# Patient Record
(demographics unavailable — no encounter records)

---

## 2025-03-14 NOTE — PHYSICAL EXAM
[Well Developed] : well developed [Well Nourished] : well nourished [No Acute Distress] : no acute distress [Normal Venous Pressure] : normal venous pressure [No Carotid Bruit] : no carotid bruit [Normal S1, S2] : normal S1, S2 [No Rub] : no rub [Normal Rate] : normal [Click] : no click [Pericardial Rub] : no pericardial rub [Rt] : no varicose veins of the right leg [Lt] : no varicose veins of the left leg [Clear Lung Fields] : clear lung fields [Good Air Entry] : good air entry [No Respiratory Distress] : no respiratory distress  [Soft] : abdomen soft [Non Tender] : non-tender [No Masses/organomegaly] : no masses/organomegaly [Normal Bowel Sounds] : normal bowel sounds [Normal Gait] : normal gait [No Edema] : no edema [No Cyanosis] : no cyanosis [No Clubbing] : no clubbing [No Varicosities] : no varicosities [No Rash] : no rash [No Skin Lesions] : no skin lesions [Moves all extremities] : moves all extremities [No Focal Deficits] : no focal deficits [Normal Speech] : normal speech [Alert and Oriented] : alert and oriented [Normal memory] : normal memory [General Appearance - Well Developed] : well developed [Normal Appearance] : normal appearance [Well Groomed] : well groomed [General Appearance - Well Nourished] : well nourished [No Deformities] : no deformities [General Appearance - In No Acute Distress] : no acute distress [Normal Conjunctiva] : the conjunctiva exhibited no abnormalities [Normal Oral Mucosa] : normal oral mucosa [Normal Oropharynx] : normal oropharynx [JVD Elevated _____cm] : JVD elevated [unfilled] ~U cm above clavicle [Normal Jugular Venous A Waves Present] : normal jugular venous A waves present [Normal Jugular Venous V Waves Present] : normal jugular venous V waves present [No Jugular Venous Moore A Waves] : no jugular venous moore A waves [Respiration, Rhythm And Depth] : normal respiratory rhythm and effort [Exaggerated Use Of Accessory Muscles For Inspiration] : no accessory muscle use [Auscultation Breath Sounds / Voice Sounds] : lungs were clear to auscultation bilaterally [Bowel Sounds] : normal bowel sounds [Abdomen Soft] : soft [Abnormal Walk] : normal gait [Gait - Sufficient For Exercise Testing] : the gait was sufficient for exercise testing [Nail Clubbing] : no clubbing of the fingernails [Cyanosis, Localized] : no localized cyanosis [Skin Color & Pigmentation] : normal skin color and pigmentation [Skin Turgor] : normal skin turgor [] : no rash [Oriented To Time, Place, And Person] : oriented to person, place, and time [Affect] : the affect was normal [Mood] : the mood was normal [No Anxiety] : not feeling anxious [5th Left ICS - MCL] : palpated at the 5th LICS in the midclavicular line [Normal] : normal [No Precordial Heave] : no precordial heave was noted [Apical Thrill] : no thrill palpable at the apex [Bradycardia] : bradycardic [Heart Rate ___] : [unfilled] bpm [Rhythm Regular] : regular [Normal S1] : normal S1 [Normal S2] : normal S2 [No Gallop] : no gallop heard [S3] : no S3 [S4] : no S4 [No Murmur] : no murmurs heard [II] : a grade 2 [Right Carotid Bruit] : no bruit heard over the right carotid [Left Carotid Bruit] : no bruit heard over the left carotid [Right Femoral Bruit] : no bruit heard over the right femoral artery [Left Femoral Bruit] : no bruit heard over the left femoral artery [2+] : left 2+ [No Abnormalities] : the abdominal aorta was not enlarged and no bruit was heard [No Pitting Edema] : no pitting edema present

## 2025-03-14 NOTE — CARDIOLOGY SUMMARY
[de-identified] : 6/25/2021 [de-identified] : Holter: 10/28/2020 [de-identified] : 8/17/2020 [de-identified] : 2/17/2020 [de-identified] : Carotid: 8/27/2020 [___] : [unfilled] [LVEF ___%] : LVEF [unfilled]%

## 2025-03-14 NOTE — DISCUSSION/SUMMARY
[FreeTextEntry1] : This is a 71-year-old female with past medical history significant for occasional palpitations, murmur, who comes in for follow-up cardiac evaluation.  She denies chest pain, shortness of breath, dizziness, palpitations or syncope. The patient was hospitalized at [facility] in March 2025 after complaining of rapid heartbeat.  No significant arrhythmias were noted. Nuclear stress test done March 11, 2025 demonstrated small size mild defect in the distal anterior and apical wall that was very reversible consistent with myocardial ischemia.  Medium size moderate defect in the inferior inferior septal wall that was fixed consistent with infarct with estimated ejection fraction 63%.  The patient was able to exercise for 12 minutes achieving 13.4 METS; EKG demonstrated 1.5 mm upsloping ST depressions in leads V3 through V6. The patient underwent cardiac catheterization March 12, 2025 which demonstrated minor irregularities in the proximal left anterior descending artery with no disease in the right coronary artery, left circumflex artery, and left main artery.  The patient was sent home with an MCOT monitor to rule out significant arrhythmia or heart block.  She will make an appoint with Dr. Colby of electrophysiology.. Further recommendations will be made after the results of her MCOT monitor is available for review. The patient had normal exercise stress test April 24, 2024. The patient will continue on her current diet and exercise program. Blood work done November 22, 2023 demonstrated cholesterol 190, HDL of 103, triglycerides of 36, LDL calculated 77, LDL direct 77 mg/dL and non-HDL cholesterol 87 mg/dL.. She will remain on her current antihypertensive therapy Electrocardiogram done October 24, 2023 demonstrates sinus bradycardia rate 54 bpm is otherwise remarkable for lead reversal, chest leads are unremarkable. The patient has been stable on telmisartan 80 mg daily, doxazosin 1 mg (by her nephrologist) taken at bedtime, and amlodipine 10 mg. She understands she must maintain good hydration. Electrocardiogram done May 23, 2023 demonstrate sinus bradycardia rate of 53 bpm is otherwise remarkable for juvenile T wave pattern. Lipid panel done May 9, 2023 demonstrated cholesterol 194, HDL of 100, triglycerides 36, LDL calculated 84 mg/dL and non-HDL cholesterol 94 mg/dL.. The patient had a normal exercise stress test July 18, 2022 with an appropriate/normal blood pressure response to exercise.    She will continue work with a registered dietitian. Blood work done October 24, 2022 demonstrated total cholesterol 179, HDL of 89, triglycerides of 58, LDL cholesterol 77 mg/dL non-HDL cholesterol 90 mg/dL with hemoglobin A1c of 5.6 The patient had 24-hour blood pressure monitor done November 17, 2021 which demonstrated an average blood pressure 133/73 during the daytime and 131/72 during the nighttime. Electrocardiogram done November 8, 2021 demonstrates normal sinus rhythm rate 60 bpm is otherwise unremarkable. The patient had a normal exercise stress test August 17, 2020. Echo Doppler examination done August 17, 2020 demonstrated borderline criteria for posterior leaflet mitral valve prolapse with mild mitral valve regurgitation, mild tricuspid valve regurgitation, minimal pulmonic valve regurgitation with estimated ejection fraction of 64%  The patient understands that aerobic exercises must be increased to 40 minutes 4 times per week. A detailed discussion of lifestyle modification was done today. The patient has a good understanding of the diagnosis, and treatment plan. Lifestyle modification was also outlined.

## 2025-03-14 NOTE — REASON FOR VISIT
[CV Risk Factors and Non-Cardiac Disease] : CV risk factors and non-cardiac disease [Arrhythmia/ECG Abnorrmalities] : arrhythmia/ECG abnormalities [Hypertension] : hypertension [FreeTextEntry3] : Dr Estes [Follow-Up - Clinic] : a clinic follow-up of [Palpitations] : palpitations [FreeTextEntry1] : murmur

## 2025-07-10 NOTE — ADDENDUM
[FreeTextEntry1] : I, Chelle Abad wrote this note acting as a scribe for Dr. Franck Zazueta on 07/10/2025.   All medical record entries made by the Scribe were at my, Dr. Franck Zazueta MD., direction and personally dictated by me on 07/10/2025. I have personally reviewed the chart and agree that the record accurately reflects my personal performance of the history, physical exam, assessment and plan.

## 2025-07-10 NOTE — PHYSICAL EXAM
[de-identified] : Patient is WDWN, alert, and in no acute distress. Breathing is unlabored. She is grossly oriented to person, place, and time.  Right Hand: There is mild basal joint tenderness. Full arc of motion in the fingers with pain on thumb ROM. All intrinsic and  extrinsic hand muscles 5/5. No joint instability on provocative testing. Sensation is intact to light touch. There are no skin lesions or discoloration.  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

## 2025-07-10 NOTE — DISCUSSION/SUMMARY
[FreeTextEntry1] : The underlying pathophysiology was reviewed with the patient. XR films were reviewed with the patient. Discussed at length the nature of the patients condition. The right hand symptoms are secondary to CMC arthritis. I reassured her that her condition is not of serious concern and is very common.   Treatment options were discussed including; observation, NSAIDS, analgesics, injection, physical therapy, and surgical intervention.  Wear a thumb spica brace as needed. I advised an injection after she tries conservative treatment.   Patient was advised to try OTC medication and topical analgesics for pain management. I recommend that the patient utilize Tylenol, Advil, Aleve, Voltaren gel, Icy Hot, Biofreeze, Bengay, or 4% lidocaine patches.  Gentle range of motion, stretching, and strengthening exercises were encouraged. Increase activity as tolerated.  All questions answered, understanding verbalized. Patient in agreement with plan of care. Patient is advised to follow-up as needed.  If the patient begins to experience any changes or severe exacerbation of her symptoms, she should reach out to me as soon as possible.

## 2025-07-10 NOTE — HISTORY OF PRESENT ILLNESS
[FreeTextEntry1] : Pt is a 70 y/o female RHD c/o right hand pain x few months. Denies injury. Pain and weakness is elicited only with movement and grabbing objects. The pain is more so by the radial aspect/thumb side. She is s/p bilateral CTR and trigger finger release 14 years ago by Dr MAURO

## 2025-07-23 NOTE — ASSESSMENT
[FreeTextEntry1] : This is a 71-year-old female here today for follow up cardiac evaluation.  She has a past medical history significant for mild coronary artery disease, occasional palpitations, sinus bradycardia, and occasional dyspnea on exertion.   HPI: She is feeling generally well today and denies chest pain, dizziness, heart palpitations, recent episodes of syncope or falls, SOB, or dyspnea at this time.  Current Medications: Aspirin 81 mg daily, Amlodipine 10 mg daily, Telmisartan 80 mg daily, and Doxazosin 1 mg daily.   BLOOD PRESSURE: Stable. Reports labile BPs at home when she checks with values sometimes in the 140s systolic. She has history of electrolyte abnormalities on HCTZ in the past and was told never to take the medication. She will keep a log of her home BPs and let us know if BP continues to show systolic reading > 140.    BLOOD WORK:  *LDL target goal < 70* -New blood work to be done this Friday prior to PCP visit with Dr. Quintanilla evaluate lipid profile, CBC, BMP, hepatic function, A1C and TSH. -Lipid panel done October 2024 demonstrated cholesterol 207, , triglycerides 40, LDL 90, non-, LDL direct 90.     TESTING/REPORTS: -EKG done 07/23/2025 demonstrated sinus bradycardia rate 48 bpm otherwise remarkable for nonspecific ST-T wave changes.   -MOCT monitor done March-April 2025 demonstrating SVT occurrence 1 time with fastest run being 1434 bpm, 1% PVC burden.   -Cardiac catheterization done March 12, 2025 which demonstrated minor irregularities in the proximal left anterior descending artery with no disease in the right coronary artery, left circumflex artery, and left main artery.  The patient was sent home with an MCOT monitor to rule out significant arrhythmia or heart block  -Nuclear stress test done March 11, 2025 demonstrated small size mild defect in the distal anterior and apical wall that was very reversible consistent with myocardial ischemia.  Medium size moderate defect in the inferior inferior septal wall that was fixed consistent with infarct with estimated ejection fraction 63%.  The patient was able to exercise for 12 minutes achieving 13.4 METS; EKG demonstrated 1.5 mm upsloping ST depressions in leads V3 through V6.  -The patient had normal exercise stress test April 24, 2024.  -Electrocardiogram done October 24, 2023 demonstrates sinus bradycardia rate 54 bpm is otherwise remarkable for lead reversal, chest leads are unremarkable.   PLAN: -She will keep a log of her home BPs and let us know if BP continues to show systolic reading > 140.  -She will continue her current medications and contact the office if problems arise before next follow-up appointment. -Follow-up with PCP routinely.   I have discussed the plan of care with MARYSOL KHALIL and she will follow up in 3 months. They are compliant with all of their medications.     The patient understands that aerobic exercises must be increased to 40 minutes 4 times/week and a detailed discussion of lifestyle modification was done today.   The patient has a good understanding of the diagnosis, treatment plan and lifestyle modification.   They will contact me at the office for any questions with their care or any changes in their health status.   The patient was discussed with supervision physician Dr. Goran Zavala at the time of the visit and the plan of care will be carried out as noted above.     WILDER Anderson NP

## 2025-07-23 NOTE — DISCUSSION/SUMMARY
[FreeTextEntry1] : Dr. Zavala-(PRIOR VISIT and PMH WITH Dr. Zavala): This is a 71-year-old female with past medical history significant for occasional palpitations, murmur, who comes in for follow-up cardiac evaluation.  She denies chest pain, shortness of breath, dizziness, palpitations or syncope. The patient was hospitalized at [facility] in March 2025 after complaining of rapid heartbeat.  No significant arrhythmias were noted. Nuclear stress test done March 11, 2025 demonstrated small size mild defect in the distal anterior and apical wall that was very reversible consistent with myocardial ischemia.  Medium size moderate defect in the inferior inferior septal wall that was fixed consistent with infarct with estimated ejection fraction 63%.  The patient was able to exercise for 12 minutes achieving 13.4 METS; EKG demonstrated 1.5 mm upsloping ST depressions in leads V3 through V6. The patient underwent cardiac catheterization March 12, 2025 which demonstrated minor irregularities in the proximal left anterior descending artery with no disease in the right coronary artery, left circumflex artery, and left main artery.  The patient was sent home with an MCOT monitor to rule out significant arrhythmia or heart block.  She will make an appoint with Dr. Colby of electrophysiology.. Further recommendations will be made after the results of her MCOT monitor is available for review. The patient had normal exercise stress test April 24, 2024. The patient will continue on her current diet and exercise program. Blood work done November 22, 2023 demonstrated cholesterol 190, HDL of 103, triglycerides of 36, LDL calculated 77, LDL direct 77 mg/dL and non-HDL cholesterol 87 mg/dL.. She will remain on her current antihypertensive therapy Electrocardiogram done October 24, 2023 demonstrates sinus bradycardia rate 54 bpm is otherwise remarkable for lead reversal, chest leads are unremarkable. The patient has been stable on telmisartan 80 mg daily, doxazosin 1 mg (by her nephrologist) taken at bedtime, and amlodipine 10 mg. She understands she must maintain good hydration. Electrocardiogram done May 23, 2023 demonstrate sinus bradycardia rate of 53 bpm is otherwise remarkable for juvenile T wave pattern. Lipid panel done May 9, 2023 demonstrated cholesterol 194, HDL of 100, triglycerides 36, LDL calculated 84 mg/dL and non-HDL cholesterol 94 mg/dL.. The patient had a normal exercise stress test July 18, 2022 with an appropriate/normal blood pressure response to exercise.    She will continue work with a registered dietitian. Blood work done October 24, 2022 demonstrated total cholesterol 179, HDL of 89, triglycerides of 58, LDL cholesterol 77 mg/dL non-HDL cholesterol 90 mg/dL with hemoglobin A1c of 5.6 The patient had 24-hour blood pressure monitor done November 17, 2021 which demonstrated an average blood pressure 133/73 during the daytime and 131/72 during the nighttime. Electrocardiogram done November 8, 2021 demonstrates normal sinus rhythm rate 60 bpm is otherwise unremarkable. The patient had a normal exercise stress test August 17, 2020. Echo Doppler examination done August 17, 2020 demonstrated borderline criteria for posterior leaflet mitral valve prolapse with mild mitral valve regurgitation, mild tricuspid valve regurgitation, minimal pulmonic valve regurgitation with estimated ejection fraction of 64%  The patient understands that aerobic exercises must be increased to 40 minutes 4 times per week. A detailed discussion of lifestyle modification was done today. The patient has a good understanding of the diagnosis, and treatment plan. Lifestyle modification was also outlined.

## 2025-07-23 NOTE — CARDIOLOGY SUMMARY
[___] : [unfilled] [LVEF ___%] : LVEF [unfilled]% [de-identified] : 6/25/2021 [de-identified] : Holter: 10/28/2020 [de-identified] : 8/17/2020 [de-identified] : 2/17/2020 [de-identified] : Carotid: 8/27/2020

## 2025-07-23 NOTE — PHYSICAL EXAM
[Well Developed] : well developed [Well Nourished] : well nourished [No Acute Distress] : no acute distress [Normal Venous Pressure] : normal venous pressure [No Carotid Bruit] : no carotid bruit [Normal S1, S2] : normal S1, S2 [No Rub] : no rub [Normal Rate] : normal [Clear Lung Fields] : clear lung fields [Good Air Entry] : good air entry [No Respiratory Distress] : no respiratory distress  [Soft] : abdomen soft [Non Tender] : non-tender [No Masses/organomegaly] : no masses/organomegaly [Normal Bowel Sounds] : normal bowel sounds [Normal Gait] : normal gait [No Edema] : no edema [No Cyanosis] : no cyanosis [No Clubbing] : no clubbing [No Varicosities] : no varicosities [No Rash] : no rash [No Skin Lesions] : no skin lesions [Moves all extremities] : moves all extremities [No Focal Deficits] : no focal deficits [Normal Speech] : normal speech [Alert and Oriented] : alert and oriented [Normal memory] : normal memory [General Appearance - Well Developed] : well developed [Normal Appearance] : normal appearance [Well Groomed] : well groomed [General Appearance - Well Nourished] : well nourished [No Deformities] : no deformities [General Appearance - In No Acute Distress] : no acute distress [Normal Conjunctiva] : the conjunctiva exhibited no abnormalities [Normal Oral Mucosa] : normal oral mucosa [Normal Oropharynx] : normal oropharynx [JVD Elevated _____cm] : JVD elevated [unfilled] ~U cm above clavicle [Normal Jugular Venous A Waves Present] : normal jugular venous A waves present [Normal Jugular Venous V Waves Present] : normal jugular venous V waves present [No Jugular Venous Moore A Waves] : no jugular venous moore A waves [Respiration, Rhythm And Depth] : normal respiratory rhythm and effort [Exaggerated Use Of Accessory Muscles For Inspiration] : no accessory muscle use [Auscultation Breath Sounds / Voice Sounds] : lungs were clear to auscultation bilaterally [Bowel Sounds] : normal bowel sounds [Abdomen Soft] : soft [Abnormal Walk] : normal gait [Gait - Sufficient For Exercise Testing] : the gait was sufficient for exercise testing [Nail Clubbing] : no clubbing of the fingernails [Cyanosis, Localized] : no localized cyanosis [Skin Color & Pigmentation] : normal skin color and pigmentation [Skin Turgor] : normal skin turgor [] : no rash [Oriented To Time, Place, And Person] : oriented to person, place, and time [Affect] : the affect was normal [Mood] : the mood was normal [No Anxiety] : not feeling anxious [5th Left ICS - MCL] : palpated at the 5th LICS in the midclavicular line [Normal] : normal [No Precordial Heave] : no precordial heave was noted [Bradycardia] : bradycardic [Heart Rate ___] : [unfilled] bpm [Rhythm Regular] : regular [Normal S1] : normal S1 [Normal S2] : normal S2 [No Gallop] : no gallop heard [No Murmur] : no murmurs heard [II] : a grade 2 [2+] : left 2+ [No Abnormalities] : the abdominal aorta was not enlarged and no bruit was heard [No Pitting Edema] : no pitting edema present [Click] : no click [Pericardial Rub] : no pericardial rub [Rt] : no varicose veins of the right leg [Lt] : no varicose veins of the left leg [Apical Thrill] : no thrill palpable at the apex [S3] : no S3 [S4] : no S4 [Right Carotid Bruit] : no bruit heard over the right carotid [Left Carotid Bruit] : no bruit heard over the left carotid [Right Femoral Bruit] : no bruit heard over the right femoral artery [Left Femoral Bruit] : no bruit heard over the left femoral artery

## 2025-07-23 NOTE — REASON FOR VISIT
[CV Risk Factors and Non-Cardiac Disease] : CV risk factors and non-cardiac disease [Arrhythmia/ECG Abnorrmalities] : arrhythmia/ECG abnormalities [Hypertension] : hypertension [Follow-Up - Clinic] : a clinic follow-up of [Palpitations] : palpitations [FreeTextEntry3] : Dr Estes [FreeTextEntry1] : murmur

## 2025-07-30 NOTE — HISTORY OF PRESENT ILLNESS
[FreeTextEntry1] : JOSE DAVID [de-identified] : AWV  Establish care  PMHx: CKD, HTN  Hospitalized 3/2025 for ?panic attack Felt lightheaded and chest discomfort  Cardiac work-up ultimately negative including angiogram  No further episodes since then  #CKD  stable  Dr. Salazar - nephrologist - doxazosin  #Leukopenia #Anemia hematology eval  Dr. Ham thus far no conclusive diagnosis made - just observation

## 2025-07-30 NOTE — HEALTH RISK ASSESSMENT
.   [Good] : ~his/her~  mood as  good [No] : In the past 12 months have you used drugs other than those required for medical reasons? No [No falls in past year] : Patient reported no falls in the past year [0] : 2) Feeling down, depressed, or hopeless: Not at all (0) [PHQ-2 Negative - No further assessment needed] : PHQ-2 Negative - No further assessment needed [Time Spent: ___ Minutes] : I spent [unfilled] minutes performing a depression screening for this patient. [None] : Patient does not have any barriers to medication adherence [Yes] : Reviewed medication list for presence of high-risk medications. [Opioids] : opioids [Never] : Never [NO] : No [Patient reported mammogram was normal] : Patient reported mammogram was normal [Patient reported PAP Smear was normal] : Patient reported PAP Smear was normal [Patient reported bone density results were normal] : Patient reported bone density results were normal [Patient reported colonoscopy was normal] : Patient reported colonoscopy was normal [HIV test declined] : HIV test declined [Hepatitis C test declined] : Hepatitis C test declined [With Family] : lives with family [Employed] : employed [Single] : single [Fully functional (bathing, dressing, toileting, transferring, walking, feeding)] : Fully functional (bathing, dressing, toileting, transferring, walking, feeding) [Fully functional (using the telephone, shopping, preparing meals, housekeeping, doing laundry, using] : Fully functional and needs no help or supervision to perform IADLs (using the telephone, shopping, preparing meals, housekeeping, doing laundry, using transportation, managing medications and managing finances) [de-identified] : active [de-identified] : balanced varied diet without restrictions  [[id number]] : 0 [High Risk Behavior] : no high risk behavior [MammogramDate] : 7/2024 [MammogramComments] : [facility] [PapSmearDate] : 1/2024 [BoneDensityDate] : 1/2023 [ColonoscopyDate] : 4/2025 [ColonoscopyComments] : 4/2030

## 2025-07-30 NOTE — HISTORY OF PRESENT ILLNESS
[FreeTextEntry1] : JOSE DAVID [de-identified] : AWV  Establish care  PMHx: CKD, HTN  Hospitalized 3/2025 for ?panic attack Felt lightheaded and chest discomfort  Cardiac work-up ultimately negative including angiogram  No further episodes since then  #CKD  stable  Dr. Salazar - nephrologist - doxazosin  #Leukopenia #Anemia hematology eval  Dr. Ham thus far no conclusive diagnosis made - just observation